# Patient Record
Sex: MALE | Race: WHITE | NOT HISPANIC OR LATINO | URBAN - METROPOLITAN AREA
[De-identification: names, ages, dates, MRNs, and addresses within clinical notes are randomized per-mention and may not be internally consistent; named-entity substitution may affect disease eponyms.]

---

## 2020-10-20 ENCOUNTER — OFFICE VISIT (OUTPATIENT)
Dept: URBAN - METROPOLITAN AREA CLINIC 121 | Facility: CLINIC | Age: 75
End: 2020-10-20

## 2021-08-25 ENCOUNTER — OFFICE VISIT (OUTPATIENT)
Dept: URBAN - METROPOLITAN AREA CLINIC 57 | Facility: CLINIC | Age: 76
End: 2021-08-25

## 2021-09-01 ENCOUNTER — OFFICE VISIT (OUTPATIENT)
Dept: URBAN - METROPOLITAN AREA CLINIC 121 | Facility: CLINIC | Age: 76
End: 2021-09-01

## 2021-09-20 ENCOUNTER — OFFICE VISIT (OUTPATIENT)
Dept: URBAN - METROPOLITAN AREA MEDICAL CENTER 7 | Facility: MEDICAL CENTER | Age: 76
End: 2021-09-20

## 2021-10-06 ENCOUNTER — OFFICE VISIT (OUTPATIENT)
Dept: URBAN - METROPOLITAN AREA CLINIC 57 | Facility: CLINIC | Age: 76
End: 2021-10-06

## 2022-07-09 ENCOUNTER — TELEPHONE ENCOUNTER (OUTPATIENT)
Dept: URBAN - METROPOLITAN AREA CLINIC 121 | Facility: CLINIC | Age: 77
End: 2022-07-09

## 2022-07-09 RX ORDER — ASPIRIN 81 MG/1
TABLET, FILM COATED ORAL ONCE A DAY
Refills: 0 | OUTPATIENT
Start: 2021-08-25 | End: 2021-10-06

## 2022-07-09 RX ORDER — AMLODIPINE BESYLATE AND BENAZEPRIL HYDROCHLORIDE 5; 20 MG/1; MG/1
CAPSULE ORAL
Refills: 0 | OUTPATIENT
Start: 2019-01-02 | End: 2021-08-25

## 2022-07-09 RX ORDER — ISOSORBIDE MONONITRATE 30 MG/1
TABLET, EXTENDED RELEASE ORAL ONCE A DAY
Refills: 0 | OUTPATIENT
Start: 2021-08-25 | End: 2021-10-06

## 2022-07-09 RX ORDER — ASPIRIN 81 MG/1
TABLET, FILM COATED ORAL
Refills: 0 | OUTPATIENT
Start: 2019-01-02 | End: 2021-08-25

## 2022-07-09 RX ORDER — LORATADINE 5 MG/5 ML
SOLUTION, ORAL ORAL
Refills: 0 | OUTPATIENT
Start: 2019-01-02 | End: 2021-08-25

## 2022-07-09 RX ORDER — AMLODIPINE BESYLATE AND BENAZEPRIL HYDROCHLORIDE 5; 40 MG/1; MG/1
CAPSULE ORAL ONCE A DAY
Refills: 0 | OUTPATIENT
Start: 2021-08-25 | End: 2021-10-06

## 2022-07-09 RX ORDER — PANTOPRAZOLE SODIUM 40 MG/1
TABLET, DELAYED RELEASE ORAL ONCE A DAY
Refills: 0 | OUTPATIENT
Start: 2021-08-25 | End: 2021-10-06

## 2022-07-09 RX ORDER — CHLORTHALIDONE 25 MG/1
TABLET ORAL ONCE A DAY
Refills: 0 | OUTPATIENT
Start: 2021-08-25 | End: 2021-10-06

## 2022-07-09 RX ORDER — METOPROLOL SUCCINATE 50 MG/1
TABLET, EXTENDED RELEASE ORAL ONCE A DAY
Refills: 0 | OUTPATIENT
Start: 2021-08-25 | End: 2021-10-06

## 2022-07-09 RX ORDER — EZETIMIBE 10 MG/1
TABLET ORAL
Refills: 0 | OUTPATIENT
Start: 2019-01-02 | End: 2021-08-25

## 2022-07-09 RX ORDER — PANTOPRAZOLE SODIUM 40 MG/1
TABLET, DELAYED RELEASE ORAL
Refills: 0 | OUTPATIENT
Start: 2019-01-02 | End: 2021-08-25

## 2022-07-10 ENCOUNTER — TELEPHONE ENCOUNTER (OUTPATIENT)
Dept: URBAN - METROPOLITAN AREA CLINIC 121 | Facility: CLINIC | Age: 77
End: 2022-07-10

## 2022-07-10 RX ORDER — OMEPRAZOLE 20 MG/1
ONCE A DAY CAPSULE, DELAYED RELEASE ORAL ONCE A DAY
Refills: 3 | Status: ACTIVE | COMMUNITY
Start: 2021-10-06

## 2022-07-10 RX ORDER — OMEGA-3S/DHA/EPA/FISH OIL 980-1400MG
CAPSULE,DELAYED RELEASE (ENTERIC COATED) ORAL
Refills: 0 | Status: ACTIVE | COMMUNITY
Start: 2019-01-02

## 2022-07-10 RX ORDER — OMEPRAZOLE 40 MG/1
ONCE A DAY 30-60 MINUTES PRIOR TO EVENING MEAL CAPSULE, DELAYED RELEASE ORAL ONCE A DAY
Refills: 1 | Status: ACTIVE | COMMUNITY
Start: 2021-08-25

## 2022-07-10 RX ORDER — AMLODIPINE BESYLATE AND BENAZEPRIL HYDROCHLORIDE 5; 40 MG/1; MG/1
CAPSULE ORAL ONCE A DAY
Refills: 0 | Status: ACTIVE | COMMUNITY
Start: 2021-10-06

## 2022-07-10 RX ORDER — ASPIRIN 81 MG/1
TABLET, FILM COATED ORAL ONCE A DAY
Refills: 0 | Status: ACTIVE | COMMUNITY
Start: 2021-10-06

## 2022-07-10 RX ORDER — CHLORTHALIDONE 25 MG/1
TABLET ORAL ONCE A DAY
Refills: 0 | Status: ACTIVE | COMMUNITY
Start: 2021-10-06

## 2022-07-10 RX ORDER — ONDANSETRON 4 MG/1
TAKE AS DIRECTED TAKE ONE TABLET 30 MINUTES PRIOR TO EACH PART OF COLONOSCOPY PREP TABLET, ORALLY DISINTEGRATING ORAL TAKE AS DIRECTED
Refills: 0 | Status: ACTIVE | COMMUNITY
Start: 2021-08-25

## 2022-07-10 RX ORDER — METOPROLOL SUCCINATE 25 MG/1
TABLET, EXTENDED RELEASE ORAL ONCE A DAY
Refills: 0 | Status: ACTIVE | COMMUNITY
Start: 2021-07-09

## 2022-07-10 RX ORDER — ALIROCUMAB 75 MG/ML
INJECTION, SOLUTION SUBCUTANEOUS
Refills: 0 | Status: ACTIVE | COMMUNITY
Start: 2021-08-25

## 2022-10-07 ENCOUNTER — ERX REFILL RESPONSE (OUTPATIENT)
Dept: URBAN - METROPOLITAN AREA CLINIC 63 | Facility: CLINIC | Age: 77
End: 2022-10-07

## 2022-10-07 RX ORDER — OMEPRAZOLE 20 MG/1
TAKE 1 CAPSULE BY MOUTH ONCE A DAY 30-60 MINUTES PRIOR TO EVENING MEAL CAPSULE, DELAYED RELEASE ORAL
Qty: 90 CAPSULE | Refills: 3 | OUTPATIENT

## 2022-10-07 RX ORDER — OMEPRAZOLE 20 MG/1
TAKE 1 CAPSULE BY MOUTH ONCE A DAY 30-60 MINUTES PRIOR TO EVENING MEAL CAPSULE, DELAYED RELEASE ORAL
Qty: 90 CAPSULE | Refills: 4 | OUTPATIENT

## 2023-07-05 ENCOUNTER — OFFICE VISIT (OUTPATIENT)
Dept: URBAN - METROPOLITAN AREA CLINIC 57 | Facility: CLINIC | Age: 78
End: 2023-07-05
Payer: MEDICARE

## 2023-07-05 ENCOUNTER — DASHBOARD ENCOUNTERS (OUTPATIENT)
Age: 78
End: 2023-07-05

## 2023-07-05 ENCOUNTER — WEB ENCOUNTER (OUTPATIENT)
Dept: URBAN - METROPOLITAN AREA CLINIC 57 | Facility: CLINIC | Age: 78
End: 2023-07-05

## 2023-07-05 ENCOUNTER — LAB OUTSIDE AN ENCOUNTER (OUTPATIENT)
Dept: URBAN - METROPOLITAN AREA CLINIC 57 | Facility: CLINIC | Age: 78
End: 2023-07-05

## 2023-07-05 VITALS
DIASTOLIC BLOOD PRESSURE: 64 MMHG | HEART RATE: 68 BPM | BODY MASS INDEX: 30.29 KG/M2 | OXYGEN SATURATION: 97 % | HEIGHT: 67 IN | WEIGHT: 193 LBS | TEMPERATURE: 96.9 F | SYSTOLIC BLOOD PRESSURE: 124 MMHG

## 2023-07-05 DIAGNOSIS — K21.9 CHRONIC GERD: ICD-10-CM

## 2023-07-05 PROCEDURE — 99203 OFFICE O/P NEW LOW 30 MIN: CPT | Performed by: INTERNAL MEDICINE

## 2023-07-05 RX ORDER — OMEGA-3S/DHA/EPA/FISH OIL 980-1400MG
CAPSULE,DELAYED RELEASE (ENTERIC COATED) ORAL
Refills: 0 | Status: ACTIVE | COMMUNITY
Start: 2019-01-02

## 2023-07-05 RX ORDER — ISOSORBIDE MONONITRATE 60 MG/1
TAKE 1 TABLET BY MOUTH EVERY DAY TABLET, EXTENDED RELEASE ORAL
Qty: 90 EACH | Refills: 1 | Status: ACTIVE | COMMUNITY

## 2023-07-05 RX ORDER — OMEPRAZOLE 40 MG/1
ONCE A DAY 30-60 MINUTES PRIOR TO EVENING MEAL CAPSULE, DELAYED RELEASE ORAL ONCE A DAY
Refills: 1 | Status: ACTIVE | COMMUNITY
Start: 2021-08-25

## 2023-07-05 RX ORDER — FLUOCINOLONE ACETONIDE 0.11 MG/118.28ML
2 DROPS AFFECTED EAR UP TO TWICE A DAY AS NEEDED FOR ITCHING OIL TOPICAL
Qty: 118.28 MILLILITER | Refills: 0 | Status: ACTIVE | COMMUNITY

## 2023-07-05 RX ORDER — TOBRAMYCIN AND DEXAMETHASONE 3; 1 MG/ML; MG/ML
INSTILL 2 DROPS INTO RIGHT EAR FOR 10 DAYS SUSPENSION/ DROPS OPHTHALMIC
Qty: 10 MILLILITER | Refills: 0 | Status: ACTIVE | COMMUNITY

## 2023-07-05 RX ORDER — CARVEDILOL 3.12 MG/1
1 TABLET WITH FOOD TABLET, FILM COATED ORAL TWICE A DAY
Status: ACTIVE | COMMUNITY

## 2023-07-05 RX ORDER — OMEPRAZOLE 20 MG/1
TAKE 1 CAPSULE BY MOUTH ONCE A DAY 30-60 MINUTES PRIOR TO EVENING MEAL CAPSULE, DELAYED RELEASE ORAL
Qty: 90 CAPSULE | Refills: 3 | Status: ACTIVE | COMMUNITY

## 2023-07-05 RX ORDER — ASPIRIN 81 MG/1
TABLET, FILM COATED ORAL ONCE A DAY
Refills: 0 | Status: ACTIVE | COMMUNITY
Start: 2021-10-06

## 2023-07-05 RX ORDER — ALIROCUMAB 75 MG/ML
AS DIRECTED INJECTION, SOLUTION SUBCUTANEOUS
Status: ACTIVE | COMMUNITY

## 2023-07-05 NOTE — HPI-TODAY'S VISIT:
Here for evaluation of new onset acid reflux. He states that he was doing well at the left few months ago. Had an episode of significant chest pressure and heartburn over late last year and ended up in the ER. Symptoms improved resolved with Maalox but ended up having an catheterization done and apparently was unremarkable. He was doing fairly well with omeprazole once daily. Of late, he is having symptoms of reflux and chest discomfort if he eats a heavy meal or drinks scotch or smokes a cigar. Symptoms at night. Denies any dysphagia, nausea or vomiting. Nocturnal symptoms do get better with Tums. Last upper endoscopy was about 3 years ago which showed a small hiatal hernia and gastritis. He does not take any NSAIDs.  No carbonated beverages reported.

## 2023-07-17 ENCOUNTER — OFFICE VISIT (OUTPATIENT)
Dept: URBAN - METROPOLITAN AREA CLINIC 63 | Facility: CLINIC | Age: 78
End: 2023-07-17

## 2023-07-26 ENCOUNTER — TELEPHONE ENCOUNTER (OUTPATIENT)
Dept: URBAN - METROPOLITAN AREA CLINIC 23 | Facility: CLINIC | Age: 78
End: 2023-07-26

## 2023-08-09 ENCOUNTER — OFFICE VISIT (OUTPATIENT)
Dept: URBAN - METROPOLITAN AREA CLINIC 57 | Facility: CLINIC | Age: 78
End: 2023-08-09

## 2024-04-30 NOTE — PHYSICAL EXAM CHEST:
chest wall non-tender, breathing is unlabored without accessory muscle use, normal breath sounds Pt presents to ED c/o decreased PO intake, constipation and "swelling all over the bidy". Denies CP.SOB, fever, chills. Pt denies any PMHx. Pt A&Ox4, conversive in full sentences and ambulates w/ cane.

## 2024-05-24 ENCOUNTER — OFFICE VISIT (OUTPATIENT)
Dept: URBAN - METROPOLITAN AREA CLINIC 57 | Facility: CLINIC | Age: 79
End: 2024-05-24

## 2025-06-06 ENCOUNTER — TELEPHONE ENCOUNTER (OUTPATIENT)
Dept: URBAN - METROPOLITAN AREA CLINIC 66 | Facility: CLINIC | Age: 80
End: 2025-06-06

## 2025-06-06 ENCOUNTER — LAB OUTSIDE AN ENCOUNTER (OUTPATIENT)
Dept: URBAN - METROPOLITAN AREA CLINIC 57 | Facility: CLINIC | Age: 80
End: 2025-06-06

## 2025-06-06 ENCOUNTER — OFFICE VISIT (OUTPATIENT)
Dept: URBAN - METROPOLITAN AREA CLINIC 57 | Facility: CLINIC | Age: 80
End: 2025-06-06
Payer: MEDICARE

## 2025-06-06 DIAGNOSIS — R19.7 ACUTE DIARRHEA: ICD-10-CM

## 2025-06-06 DIAGNOSIS — Z86.0101 HISTORY OF ADENOMATOUS POLYP OF COLON: ICD-10-CM

## 2025-06-06 DIAGNOSIS — R93.89 IMAGING ABNORMALITY: ICD-10-CM

## 2025-06-06 PROBLEM — 62315008: Status: ACTIVE | Noted: 2025-06-06

## 2025-06-06 PROBLEM — 305058001: Status: ACTIVE | Noted: 2025-06-06

## 2025-06-06 PROBLEM — 168501001: Status: ACTIVE | Noted: 2025-06-06

## 2025-06-06 PROCEDURE — 99214 OFFICE O/P EST MOD 30 MIN: CPT

## 2025-06-06 RX ORDER — ASPIRIN 81 MG/1
TABLET, FILM COATED ORAL ONCE A DAY
Refills: 0 | Status: ACTIVE | COMMUNITY
Start: 2021-10-06

## 2025-06-06 RX ORDER — ISOSORBIDE MONONITRATE 60 MG/1
TAKE 1 TABLET BY MOUTH EVERY DAY TABLET, EXTENDED RELEASE ORAL
Qty: 90 EACH | Refills: 1 | Status: ACTIVE | COMMUNITY

## 2025-06-06 RX ORDER — RANOLAZINE 500 MG/1
1 TABLET TABLET, FILM COATED, EXTENDED RELEASE ORAL TWICE A DAY
Status: ACTIVE | COMMUNITY

## 2025-06-06 RX ORDER — BENAZEPRIL HYDROCHLORIDE 40 MG/1
1 TABLET TABLET ORAL ONCE A DAY
Status: ACTIVE | COMMUNITY

## 2025-06-06 RX ORDER — PRASUGREL 10 MG/1
AS DIRECTED TABLET, FILM COATED ORAL
Status: ACTIVE | COMMUNITY

## 2025-06-06 RX ORDER — OMEGA-3S/DHA/EPA/FISH OIL 980-1400MG
CAPSULE,DELAYED RELEASE (ENTERIC COATED) ORAL
Refills: 0 | Status: ACTIVE | COMMUNITY
Start: 2019-01-02

## 2025-06-06 NOTE — HPI-TODAY'S VISIT:
79-year-old male presents the office today for abnormal findings on CT.  Patient reports that he was in [place] visiting his childen and the following day after traveling he started to have copious amounts of diarrhea.  He reports that he ate an entire bag of nuts during his travels.  He reports that he felt that it was possible the nuts caused him to have an episode of diverticulitis.  He presented to his primary care who ordered a CT.  CT findings are as listed below.  Patient was started on Augmentin and a prednisone Dosepak.  In office today patient reports he is feeling extremely better.  He reports he has not had any more episodes of diarrhea.  We discussed that CT findings were possibly from a viral etiology.  We will follow-up in a few weeks to see how patient is doing and see if any further imaging is indicated.  He is also due for his colonoscopy.  Last colonoscopy was in 2021 with a 3-year recall.  Colonoscopy will be ordered in office today. He denies dysphagia, dyspepsia, pyrosis, unintentional weight loss, melena, or hematochezia. Patient denies issues with anesthesia, history of stroke,  pacemaker, defibrillator, COPD, asthma, chronic kidney disease and seizures.  Patient has a past surgical history of CABG x4.  He also admits to stent placement 10 months ago.  He is currently on prasugrel and understands to hold antiplatelet 5 days prior to procedure.   CT abdomen pelvis 6/2/2025 - Long segment of inflammatory changes involving small bowel in the left mid abdomen suggestive of enteritis of either infectious or inflammatory etiology.  Small bowel appears to be diffusely distended up to 3 cm in diameter without transition point.  The distal and terminal ileum appeared normal.  Recommend short-term follow-up - Left adrenal nodule measuring up to 1.4 cm of indeterminate etiology.  This could be further evaluated with CT or MRI adrenal protocol, when clinically appropriate - Benign-appearing renal cysts  Last colonoscopy 9/28/2021 - Diverticulosis mild in degree involving the entire colon - Small internal hemorrhoids - One 3 mm polyp in the cecum - One 6 mm polyp in the ileocecal valve - One 7 mm polyp in the ascending colon - Repeat colonoscopy in 3 years - Pathology: Cecum polyp tubular adenoma, ileocecal valve polyp tubular adenoma, ascending colon polyp tubular adenoma

## 2025-07-18 ENCOUNTER — OFFICE VISIT (OUTPATIENT)
Dept: URBAN - METROPOLITAN AREA CLINIC 57 | Facility: CLINIC | Age: 80
End: 2025-07-18
Payer: MEDICARE

## 2025-07-18 DIAGNOSIS — R19.7 DIARRHEA, UNSPECIFIED TYPE: ICD-10-CM

## 2025-07-18 PROCEDURE — 99213 OFFICE O/P EST LOW 20 MIN: CPT

## 2025-07-18 RX ORDER — ISOSORBIDE MONONITRATE 60 MG/1
TAKE 1 TABLET BY MOUTH EVERY DAY TABLET, EXTENDED RELEASE ORAL
Qty: 90 EACH | Refills: 1 | Status: ACTIVE | COMMUNITY

## 2025-07-18 RX ORDER — ASPIRIN 81 MG/1
TABLET, FILM COATED ORAL ONCE A DAY
Refills: 0 | Status: ACTIVE | COMMUNITY
Start: 2021-10-06

## 2025-07-18 RX ORDER — BENAZEPRIL HYDROCHLORIDE 40 MG/1
1 TABLET TABLET ORAL ONCE A DAY
Status: ACTIVE | COMMUNITY

## 2025-07-18 RX ORDER — RANOLAZINE 500 MG/1
1 TABLET TABLET, FILM COATED, EXTENDED RELEASE ORAL TWICE A DAY
Status: ACTIVE | COMMUNITY

## 2025-07-18 RX ORDER — OMEGA-3S/DHA/EPA/FISH OIL 980-1400MG
CAPSULE,DELAYED RELEASE (ENTERIC COATED) ORAL
Refills: 0 | Status: ACTIVE | COMMUNITY
Start: 2019-01-02

## 2025-07-18 RX ORDER — PRASUGREL 10 MG/1
AS DIRECTED TABLET, FILM COATED ORAL
Status: ACTIVE | COMMUNITY

## 2025-07-18 NOTE — HPI-TODAY'S VISIT:
79-year-old male presents the office today for follow-up.  He previously presented to the office for abnormal findings on CT. Patient previously reported that he was in [place] visiting his childen and the following day after traveling he started to have copious amounts of diarrhea. He reports that he ate an entire bag of nuts during his travels. He reports that he felt that it was possible the nuts caused him to have an episode of diverticulitis. He presented to his primary care who ordered a CT. CT findings are as listed below. Patient was started on Augmentin and a prednisone Dosepak.  In office today patient reports that he is completely asymptomatic.  He reports that his bowel movements have returned completely to help.  We discussed that the CT findings were likely viral in origin.  His last colonoscopy was in 2021 with a 3-year recall.  Colonoscopy has already been scheduled for September 5. He denies dysphagia, dyspepsia, pyrosis, unintentional weight loss, melena, or hematochezia.  CT abdomen pelvis 6/2/2025 - Long segment of inflammatory changes involving small bowel in the left mid abdomen suggestive of enteritis of either infectious or inflammatory etiology.  Small bowel appears to be diffusely distended up to 3 cm in diameter without transition point.  The distal and terminal ileum appeared normal.  Recommend short-term follow-up - Left adrenal nodule measuring up to 1.4 cm of indeterminate etiology.  This could be further evaluated with CT or MRI adrenal protocol, when clinically appropriate - Benign-appearing renal cysts  Last colonoscopy 9/28/2021 - Diverticulosis mild in degree involving the entire colon - Small internal hemorrhoids - One 3 mm polyp in the cecum - One 6 mm polyp in the ileocecal valve - One 7 mm polyp in the ascending colon - Repeat colonoscopy in 3 years - Pathology: Cecum polyp tubular adenoma, ileocecal valve polyp tubular adenoma, ascending colon polyp tubular adenoma

## 2025-08-26 ENCOUNTER — TELEPHONE ENCOUNTER (OUTPATIENT)
Dept: URBAN - METROPOLITAN AREA CLINIC 63 | Facility: CLINIC | Age: 80
End: 2025-08-26